# Patient Record
Sex: FEMALE | Race: ASIAN | Employment: UNEMPLOYED | URBAN - METROPOLITAN AREA
[De-identification: names, ages, dates, MRNs, and addresses within clinical notes are randomized per-mention and may not be internally consistent; named-entity substitution may affect disease eponyms.]

---

## 2017-04-25 PROBLEM — R53.82 CHRONIC FATIGUE: Status: ACTIVE | Noted: 2017-04-25

## 2017-04-25 PROBLEM — K21.9 GASTROESOPHAGEAL REFLUX DISEASE: Status: ACTIVE | Noted: 2017-04-03

## 2017-04-25 PROBLEM — R07.89 ATYPICAL CHEST PAIN: Status: ACTIVE | Noted: 2017-04-25

## 2017-04-25 PROBLEM — R07.89 ATYPICAL CHEST PAIN: Chronic | Status: ACTIVE | Noted: 2017-04-25

## 2017-04-25 PROBLEM — R06.02 SOB (SHORTNESS OF BREATH): Status: ACTIVE | Noted: 2017-04-25

## 2017-04-25 PROBLEM — K21.9 GASTROESOPHAGEAL REFLUX DISEASE: Chronic | Status: ACTIVE | Noted: 2017-04-03

## 2017-04-25 PROBLEM — R06.02 SOB (SHORTNESS OF BREATH): Chronic | Status: ACTIVE | Noted: 2017-04-25

## 2017-04-25 PROBLEM — R53.82 CHRONIC FATIGUE: Chronic | Status: ACTIVE | Noted: 2017-04-25

## 2017-05-18 ENCOUNTER — HOSPITAL ENCOUNTER (OUTPATIENT)
Dept: LAB | Age: 47
Discharge: HOME | End: 2017-05-18

## 2017-05-18 PROCEDURE — 88312 SPECIAL STAINS GROUP 1: CPT | Performed by: INTERNAL MEDICINE

## 2017-05-18 PROCEDURE — 88305 TISSUE EXAM BY PATHOLOGIST: CPT | Performed by: INTERNAL MEDICINE

## 2017-07-19 ENCOUNTER — HOSPITAL ENCOUNTER (OUTPATIENT)
Dept: LAB | Age: 47
Discharge: HOME | End: 2017-07-19

## 2017-07-19 PROCEDURE — 88312 SPECIAL STAINS GROUP 1: CPT | Performed by: INTERNAL MEDICINE

## 2017-07-19 PROCEDURE — 88305 TISSUE EXAM BY PATHOLOGIST: CPT | Performed by: INTERNAL MEDICINE

## 2021-04-13 ENCOUNTER — HOSPITAL ENCOUNTER (OUTPATIENT)
Dept: LAB | Age: 51
Discharge: HOME | End: 2021-04-13
Payer: COMMERCIAL

## 2021-04-13 DIAGNOSIS — R00.2 PALPITATION: ICD-10-CM

## 2021-04-13 LAB
ALBUMIN SERPL-MCNC: 3.6 G/DL (ref 3.5–5)
ALBUMIN/GLOB SERPL: 0.9 {RATIO} (ref 1.2–3.5)
ALP SERPL-CCNC: 68 U/L (ref 50–136)
ALT SERPL-CCNC: 26 U/L (ref 12–65)
ANION GAP SERPL CALC-SCNC: 3 MMOL/L (ref 7–16)
AST SERPL-CCNC: 22 U/L (ref 15–37)
BASOPHILS # BLD: 0.1 K/UL (ref 0–0.2)
BASOPHILS NFR BLD: 2 % (ref 0–2)
BILIRUB SERPL-MCNC: 0.1 MG/DL (ref 0.2–1.1)
BUN SERPL-MCNC: 8 MG/DL (ref 6–23)
CALCIUM SERPL-MCNC: 8.4 MG/DL (ref 8.3–10.4)
CHLORIDE SERPL-SCNC: 104 MMOL/L (ref 98–107)
CO2 SERPL-SCNC: 30 MMOL/L (ref 21–32)
CREAT SERPL-MCNC: 0.54 MG/DL (ref 0.6–1)
DIFFERENTIAL METHOD BLD: ABNORMAL
EOSINOPHIL # BLD: 0.1 K/UL (ref 0–0.8)
EOSINOPHIL NFR BLD: 4 % (ref 0.5–7.8)
ERYTHROCYTE [DISTWIDTH] IN BLOOD BY AUTOMATED COUNT: 14.4 % (ref 11.9–14.6)
GLOBULIN SER CALC-MCNC: 3.8 G/DL (ref 2.3–3.5)
GLUCOSE SERPL-MCNC: 104 MG/DL (ref 65–100)
HCT VFR BLD AUTO: 43.1 % (ref 35.8–46.3)
HGB BLD-MCNC: 13.9 G/DL (ref 11.7–15.4)
IMM GRANULOCYTES # BLD AUTO: 0 K/UL (ref 0–0.5)
IMM GRANULOCYTES NFR BLD AUTO: 0 % (ref 0–5)
LYMPHOCYTES # BLD: 1 K/UL (ref 0.5–4.6)
LYMPHOCYTES NFR BLD: 32 % (ref 13–44)
MCH RBC QN AUTO: 29.6 PG (ref 26.1–32.9)
MCHC RBC AUTO-ENTMCNC: 32.3 G/DL (ref 31.4–35)
MCV RBC AUTO: 91.9 FL (ref 79.6–97.8)
MONOCYTES # BLD: 0.3 K/UL (ref 0.1–1.3)
MONOCYTES NFR BLD: 10 % (ref 4–12)
NEUTS SEG # BLD: 1.6 K/UL (ref 1.7–8.2)
NEUTS SEG NFR BLD: 53 % (ref 43–78)
NRBC # BLD: 0 K/UL (ref 0–0.2)
PLATELET # BLD AUTO: 265 K/UL (ref 150–450)
PMV BLD AUTO: 9 FL (ref 9.4–12.3)
POTASSIUM SERPL-SCNC: 4.3 MMOL/L (ref 3.5–5.1)
PROT SERPL-MCNC: 7.4 G/DL (ref 6.3–8.2)
RBC # BLD AUTO: 4.69 M/UL (ref 4.05–5.2)
SODIUM SERPL-SCNC: 137 MMOL/L (ref 136–145)
TSH SERPL DL<=0.005 MIU/L-ACNC: 0.32 UIU/ML
WBC # BLD AUTO: 3.1 K/UL (ref 4.3–11.1)

## 2021-04-13 PROCEDURE — 84443 ASSAY THYROID STIM HORMONE: CPT

## 2021-04-13 PROCEDURE — 85025 COMPLETE CBC W/AUTO DIFF WBC: CPT

## 2021-04-13 PROCEDURE — 36415 COLL VENOUS BLD VENIPUNCTURE: CPT

## 2021-04-13 PROCEDURE — 80053 COMPREHEN METABOLIC PANEL: CPT

## 2021-04-19 ENCOUNTER — HOSPITAL ENCOUNTER (OUTPATIENT)
Dept: LAB | Age: 51
Discharge: HOME | End: 2021-04-19
Payer: COMMERCIAL

## 2021-04-19 PROCEDURE — 83835 ASSAY OF METANEPHRINES: CPT

## 2021-04-19 PROCEDURE — 82384 ASSAY THREE CATECHOLAMINES: CPT

## 2021-04-24 LAB
COLLECT DURATION TIME UR: 24 HR
DOPAMINE 24H UR-MRATE: 202 UG/24 HR (ref 0–510)
DOPAMINE UR-MCNC: 101 UG/L
EPINEPH 24H UR-MRATE: 10 UG/24 HR (ref 0–20)
EPINEPH UR-MCNC: 5 UG/L
NOREPINEPH 24H UR-MRATE: 36 UG/24 HR (ref 0–135)
NOREPINEPH UR-MCNC: 18 UG/L
SPECIMEN VOL ?TM UR: 2000 ML

## 2021-04-26 LAB
COLLECT DURATION TIME UR: 24 HR
METANEPH 24H UR-MRATE: 200 UG/24 HR (ref 36–209)
METANEPHS 24H UR-MCNC: 100 UG/L
NORMETANEPHRINE 24H UR-MCNC: 159 UG/L
NORMETANEPHRINE 24H UR-MRATE: 318 UG/24 HR (ref 131–612)
SPECIMEN VOL ?TM UR: 2000 ML

## 2021-12-02 PROCEDURE — 88305 TISSUE EXAM BY PATHOLOGIST: CPT

## 2021-12-02 PROCEDURE — 88312 SPECIAL STAINS GROUP 1: CPT

## 2021-12-03 ENCOUNTER — HOSPITAL ENCOUNTER (OUTPATIENT)
Dept: LAB | Age: 51
Discharge: HOME | End: 2021-12-03

## 2022-03-18 PROBLEM — K21.9 GASTROESOPHAGEAL REFLUX DISEASE: Status: ACTIVE | Noted: 2017-04-03

## 2022-03-18 PROBLEM — R06.02 SOB (SHORTNESS OF BREATH): Status: ACTIVE | Noted: 2017-04-25

## 2022-03-18 PROBLEM — R53.82 CHRONIC FATIGUE: Status: ACTIVE | Noted: 2017-04-25

## 2022-03-20 PROBLEM — R07.89 ATYPICAL CHEST PAIN: Status: ACTIVE | Noted: 2017-04-25

## 2024-11-19 ENCOUNTER — OFFICE VISIT (OUTPATIENT)
Dept: NEUROLOGY | Age: 54
End: 2024-11-19

## 2024-11-19 VITALS
HEART RATE: 84 BPM | SYSTOLIC BLOOD PRESSURE: 139 MMHG | DIASTOLIC BLOOD PRESSURE: 88 MMHG | BODY MASS INDEX: 18.91 KG/M2 | OXYGEN SATURATION: 98 % | HEIGHT: 59 IN | WEIGHT: 93.8 LBS

## 2024-11-19 DIAGNOSIS — R51.9 FACIAL PAIN: Primary | ICD-10-CM

## 2024-11-19 PROCEDURE — 3079F DIAST BP 80-89 MM HG: CPT | Performed by: PHYSICAL THERAPIST

## 2024-11-19 PROCEDURE — 99204 OFFICE O/P NEW MOD 45 MIN: CPT | Performed by: PHYSICAL THERAPIST

## 2024-11-19 PROCEDURE — 3075F SYST BP GE 130 - 139MM HG: CPT | Performed by: PHYSICAL THERAPIST

## 2024-11-19 RX ORDER — CARBAMAZEPINE 100 MG/1
100 TABLET, EXTENDED RELEASE ORAL 2 TIMES DAILY
Qty: 60 TABLET | Refills: 3 | Status: SHIPPED | OUTPATIENT
Start: 2024-11-19

## 2024-11-19 RX ORDER — BUTALBITAL, ACETAMINOPHEN AND CAFFEINE 50; 325; 40 MG/1; MG/1; MG/1
TABLET ORAL
COMMUNITY
Start: 2024-10-09

## 2024-11-19 RX ORDER — LISINOPRIL 20 MG/1
20 TABLET ORAL DAILY
COMMUNITY

## 2024-11-19 RX ORDER — DIAZEPAM 5 MG/1
5 TABLET ORAL 2 TIMES DAILY PRN
COMMUNITY
Start: 2024-01-26

## 2024-11-19 RX ORDER — CELECOXIB 100 MG/1
CAPSULE ORAL
COMMUNITY

## 2024-11-19 ASSESSMENT — PATIENT HEALTH QUESTIONNAIRE - PHQ9
2. FEELING DOWN, DEPRESSED OR HOPELESS: NOT AT ALL
SUM OF ALL RESPONSES TO PHQ9 QUESTIONS 1 & 2: 0
SUM OF ALL RESPONSES TO PHQ QUESTIONS 1-9: 0
SUM OF ALL RESPONSES TO PHQ QUESTIONS 1-9: 0
1. LITTLE INTEREST OR PLEASURE IN DOING THINGS: NOT AT ALL
SUM OF ALL RESPONSES TO PHQ QUESTIONS 1-9: 0
SUM OF ALL RESPONSES TO PHQ QUESTIONS 1-9: 0

## 2024-11-19 NOTE — PROGRESS NOTES
[facility] [address], [address]  [place] [address]  [phone]      Chief Complaint   Patient presents with    New Patient     Suspected trigeminal neuralgia       HPI  Eliot Collado is a 54 y.o. female with past ministry of anxiety, GERD, headache, and HTN who presents on referral from her dentist with concerns for trigeminal neuralgia.  She had a crown put in 6 months ago, and then about 4 weeks ago started noticing facial pain.  Pain started on left side, and is confined within her jaw space.  It is closer to her mouth and the sides of her face, but the pain does radiate from her mouth all the way up to her ear and forehead, occasionally behind eye.  This pain will moved to the right side occasionally as well in the same distribution.  She does not have any events that trigger or worsen the pain.  She is unaware of any specific triggers such as chewing or yawning.  It is not hypersensitive to touch, but the pain can get quite severe.  She describes the pain can escalate to 9/10 intensity.  She informs us the dentist told her that there may be some irritation of the trigeminal nerve which led to referral to our office.  No rhinorrhea, conjunctival tearing, and aside from the referral pain up towards her head no consistent headaches.  This is not accompanied by any vision changes, numbness, or weakness.  She endorses that there is always a low level of pain on 1 side or both, this usually is manageable at 1 or 2/10, but most days it will escalate for 30 minutes to several hours of 9/10 intensity.       Past Medical History:   Diagnosis Date    Anxiety     GERD (gastroesophageal reflux disease)     Headache     Hypertension        History reviewed. No pertinent surgical history.    Family History   Problem Relation Age of Onset    No Known Problems Mother     No Known Problems Father     No Known Problems Sister        Social History     Socioeconomic History    Marital

## 2024-12-13 RX ORDER — CARBAMAZEPINE 100 MG/1
100 TABLET, EXTENDED RELEASE ORAL 2 TIMES DAILY
Qty: 180 TABLET | Refills: 2 | OUTPATIENT
Start: 2024-12-13

## 2025-03-07 ENCOUNTER — HOSPITAL ENCOUNTER (OUTPATIENT)
Dept: CT IMAGING | Age: 55
Discharge: HOME | End: 2025-03-10
Payer: COMMERCIAL

## 2025-03-07 DIAGNOSIS — R91.1 PULMONARY NODULE: ICD-10-CM

## 2025-03-07 PROCEDURE — 71250 CT THORAX DX C-: CPT

## 2025-05-16 ENCOUNTER — HOSPITAL ENCOUNTER (OUTPATIENT)
Dept: PHYSICAL THERAPY | Age: 55
Setting detail: RECURRING SERIES
Discharge: HOME | End: 2025-05-19
Payer: COMMERCIAL

## 2025-05-16 DIAGNOSIS — M25.612 STIFFNESS OF LEFT SHOULDER, NOT ELSEWHERE CLASSIFIED: ICD-10-CM

## 2025-05-16 DIAGNOSIS — M25.512 CHRONIC LEFT SHOULDER PAIN: Primary | ICD-10-CM

## 2025-05-16 DIAGNOSIS — G89.29 CHRONIC LEFT SHOULDER PAIN: Primary | ICD-10-CM

## 2025-05-16 PROCEDURE — 97161 PT EVAL LOW COMPLEX 20 MIN: CPT

## 2025-05-16 ASSESSMENT — PAIN SCALES - GENERAL: PAINLEVEL_OUTOF10: 5

## 2025-05-16 ASSESSMENT — PAIN DESCRIPTION - ORIENTATION: ORIENTATION: LEFT

## 2025-05-16 ASSESSMENT — PAIN DESCRIPTION - LOCATION: LOCATION: SHOULDER

## 2025-05-16 NOTE — PROGRESS NOTES
Eliot Thu Sammi Vu  : 1970  Primary: [facility] (Medicare Managed)  Secondary:  [facility] @ [address]  [place] [address]  Phone: [phone]  Fax: [phone] Plan Frequency: 1-2 times per week for 8-12 weeks    Plan of Care/Certification Expiration Date: 25        Plan of Care/Certification Expiration Date:  Plan of Care/Certification Expiration Date: 25    Frequency/Duration: Plan Frequency: 1-2 times per week for 8-12 weeks      Time In/Out:   Time In: 08  Time Out: 08      PT Visit Info:    Total # of Visits Approved: 1  Progress Note Due Date: 06/15/25  Total # of Visits to Date: 1  Progress Note Counter: 1      Visit Count:  1    OUTPATIENT PHYSICAL THERAPY:   Treatment Note 2025       Episode  (PT: L shoulder pain)               Treatment Diagnosis:    Chronic left shoulder pain  Stiffness of left shoulder, not elsewhere classified  Medical/Referring Diagnosis:    Pain in left shoulder    Referring Physician:  Indiana Estrella DO MD Orders:  PT Eval and Treat   Return MD Appt:  not known   Date of Onset:  Onset Date:  (2-3 months)     Allergies:   Patient has no known allergies.  Restrictions/Precautions:   Hx of adenocarcinoma      Interventions Planned (Treatment may consist of any combination of the following):     See Assessment Note    Subjective Comments:   L shoulder/arm pain, fluctuates during the day  Initial Pain Level: Left Shoulder 5/10  Post Session Pain Level: Left  Shoulder 4/10  Medications Last Reviewed: 2025  Updated Objective Findings:  See Evaluation Note from today  Treatment   THERAPEUTIC EXERCISE: (5 minutes):    Exercises per grid below to improve mobility and strength.  Required minimal verbal cues to promote proper body alignment, promote proper body posture, and promote proper body mechanics.  Progressed resistance, range, repetitions, and complexity of movement as

## 2025-05-16 NOTE — THERAPY EVALUATION
Eliot Thu Sammi Vu  : 1970  Primary: [facility] (Medicare Managed)  Secondary:  [facility] @ [address]  [place] [address]  Phone: [phone]  Fax: [phone] Plan Frequency: 1-2 times per week for 8-12 weeks    Plan of Care/Certification Expiration Date: 25        Plan of Care/Certification Expiration Date:  Plan of Care/Certification Expiration Date: 25    Frequency/Duration: Plan Frequency: 1-2 times per week for 8-12 weeks      Time In/Out:   Time In: 807  Time Out: 847      PT Visit Info:    Total # of Visits Approved: 1  Progress Note Due Date: 06/15/25  Total # of Visits to Date: 1  Progress Note Counter: 1      Visit Count:  1                OUTPATIENT PHYSICAL THERAPY:             Initial Assessment 2025               Episode (PT: L shoulder pain)         Treatment Diagnosis:     Chronic left shoulder pain  Stiffness of left shoulder, not elsewhere classified  Medical/Referring Diagnosis:    Pain in left shoulder     Referring Physician:  Indiana Estrella DO MD Orders:  PT Eval and Treat   Return MD Appt:  not known  Date of Onset:  Onset Date:  (2-3 months)    Allergies:  Patient has no known allergies.  Restrictions/Precautions:    Hx of adeoncarcinoma      Medications Last Reviewed: 2025     SUBJECTIVE   History of Injury/Illness (Reason for Referral):  Prednisone helped some. Nothing really makes it worse. Feels weaker.   Pain L shoulder and down arm. No numbness but strange sensation.  Reaching with L UE bothers her.   No problems with sleeping    Per notes from MD appt 25: L shoulder pain >1 month, sometimes radiates down to elbow and occasionally to hand, intermittent, lasting hours.  No falls/injuries. Right handed. L arm feels weaker. Xray shows mild degenerative changes. No nerve entrapment.  L shoulder tendinitis; prescribed 5 days prednisone.  Patient Stated Goal(s):  \"To have

## 2025-06-06 ENCOUNTER — HOSPITAL ENCOUNTER (OUTPATIENT)
Dept: PHYSICAL THERAPY | Age: 55
Setting detail: RECURRING SERIES
End: 2025-06-06
Payer: COMMERCIAL

## 2025-06-11 ENCOUNTER — HOSPITAL ENCOUNTER (OUTPATIENT)
Dept: PHYSICAL THERAPY | Age: 55
Setting detail: RECURRING SERIES
Discharge: HOME | End: 2025-06-14
Payer: COMMERCIAL

## 2025-06-11 PROCEDURE — 97140 MANUAL THERAPY 1/> REGIONS: CPT

## 2025-06-11 PROCEDURE — 97110 THERAPEUTIC EXERCISES: CPT

## 2025-06-11 NOTE — PROGRESS NOTES
Eliot Thu Sammi Vu  : 1970  Primary: [employer] (Medicare Managed)  Secondary:  [facility] @ [address]  [facility] [address]  Phone: [phone]  Fax: [phone] Plan Frequency: 1-2 times per week for 8-12 weeks    Plan of Care/Certification Expiration Date: 25        Plan of Care/Certification Expiration Date:  Plan of Care/Certification Expiration Date: 25    Frequency/Duration: Plan Frequency: 1-2 times per week for 8-12 weeks      Time In/Out:   Time In: 815  Time Out: 855      PT Visit Info:    Total # of Visits Approved: 1  Progress Note Due Date: 06/15/25  Total # of Visits to Date: 2  Progress Note Counter: 2      Visit Count:  2    OUTPATIENT PHYSICAL THERAPY:   Treatment Note 2025       Episode  (PT: L shoulder pain)               Treatment Diagnosis:    Chronic left shoulder pain  Stiffness of left shoulder, not elsewhere classified  Medical/Referring Diagnosis:    Pain in left shoulder    Referring Physician:  Indiana Estrella DO MD Orders:  PT Eval and Treat   Return MD Appt:  not known   Date of Onset:  Onset Date:  (2-3 months)     Allergies:   Patient has no known allergies.  Restrictions/Precautions:   Hx of adenocarcinoma      Interventions Planned (Treatment may consist of any combination of the following):     See Assessment Note    Subjective Comments:   Depends on how I move it, sometimes it can be more painful than other times  Initial Pain Level:   5   /10  Post Session Pain Level:       /10  Medications Last Reviewed: 2025  Updated Objective Findings:  None Today  Treatment   THERAPEUTIC EXERCISE: (30 minutes):    Exercises per grid below to improve mobility and strength.  Required minimal verbal cues to promote proper body alignment, promote proper body posture, and promote proper body mechanics.  Progressed resistance, range, repetitions, and complexity of movement as indicated.    MANUAL:  10